# Patient Record
Sex: MALE | Race: WHITE | NOT HISPANIC OR LATINO | URBAN - METROPOLITAN AREA
[De-identification: names, ages, dates, MRNs, and addresses within clinical notes are randomized per-mention and may not be internally consistent; named-entity substitution may affect disease eponyms.]

---

## 2017-03-04 ENCOUNTER — EMERGENCY (EMERGENCY)
Facility: HOSPITAL | Age: 32
LOS: 1 days | Discharge: HOME | End: 2017-03-04
Attending: EMERGENCY MEDICINE | Admitting: EMERGENCY MEDICINE
Payer: COMMERCIAL

## 2017-03-04 VITALS
RESPIRATION RATE: 19 BRPM | OXYGEN SATURATION: 100 % | DIASTOLIC BLOOD PRESSURE: 84 MMHG | HEART RATE: 103 BPM | SYSTOLIC BLOOD PRESSURE: 173 MMHG | TEMPERATURE: 97 F

## 2017-03-04 VITALS
DIASTOLIC BLOOD PRESSURE: 70 MMHG | TEMPERATURE: 98 F | RESPIRATION RATE: 18 BRPM | HEART RATE: 94 BPM | OXYGEN SATURATION: 100 % | SYSTOLIC BLOOD PRESSURE: 145 MMHG

## 2017-03-04 DIAGNOSIS — S01.81XA LACERATION WITHOUT FOREIGN BODY OF OTHER PART OF HEAD, INITIAL ENCOUNTER: ICD-10-CM

## 2017-03-04 PROCEDURE — 70450 CT HEAD/BRAIN W/O DYE: CPT | Mod: 26

## 2017-03-04 PROCEDURE — 99284 EMERGENCY DEPT VISIT MOD MDM: CPT

## 2017-03-04 NOTE — ED PROVIDER NOTE - MEDICAL DECISION MAKING DETAILS
pt. with ETOh use, s/p mechanical fall, + lac to chin( superficial ) steri strip applied, tdap UTD, head CT neg, tolerating PO, steady gait, accompanied by father stable for d/c.

## 2017-03-04 NOTE — ED PROVIDER NOTE - CHPI ED SYMPTOMS NEG
no fever/no vomiting/no abdominal distension/no pain/no confusion/no chills/no abdominal pain/no nausea

## 2017-03-04 NOTE — ED ADULT NURSE NOTE - CHIEF COMPLAINT QUOTE
Pt bib ems from [facility] for lacerations to chin 2/2 fall on escalator; etoh, prescribed adderal, xanax on board. Pt alert, anxious.

## 2017-03-04 NOTE — ED ADULT TRIAGE NOTE - CHIEF COMPLAINT QUOTE
Pt bib ems from [facility] for lacerations to chin 2/2 fall on escalator; etoh, prescribed adderal, xanax on board. Pt alert, anxious.

## 2017-03-04 NOTE — ED PROVIDER NOTE - OBJECTIVE STATEMENT
pt. with PMH anxiety , + etoh use today, , mechanical fall, struck head, no LOC. sustain several abrasion  to chin. tdap UTD,. pt. with no complaints, no HA, no N/v, no vision changes, waiting for parent to take home.

## 2020-01-21 NOTE — ED ADULT NURSE NOTE - NS ED NOTE ABUSE RESPONSE YN
Subjective:  Mr. Zohaib Jiménez is here to follow up with concerns about his umbilical hernia incision. He reports that over the weekend he noticed a lot of redness and warmth. He called in to the answering service, and Dr. Hailey Garcia called him in an antibiotic and recommended he come in for exam today. He reports that the redness has gotten a little better since starting the antibiotic. He denies any fever. No drainage from the incision. Objective:  /70 (Site: Left Upper Arm, Position: Sitting, Cuff Size: Medium Adult)   Temp 97.8 °F (36.6 °C) (Temporal)   Ht 5' 10\" (1.778 m)   Wt 215 lb 12.8 oz (97.9 kg)   BMI 30.96 kg/m²   General: NAD, AAO  Chest: regular, non-labored  Abdomen: soft, ND, ATTP. Incision with some mild erythema around the edge. There appeared to be some liquid under the dermabond, so I lifted this. There was a little serous fluid, but no purulence.     Assessment and plan:  45year old male s/p umbilical hernia repair  1) Early cellulitis- continue and complete oral antibiotics ordered by Dr. Hailey Garcia  2) Keep incision clean and dry  3) Monitor for any drainage, increased redness, or fever  4) Keep previously scheduled post operative appointment no